# Patient Record
Sex: FEMALE | Race: WHITE
[De-identification: names, ages, dates, MRNs, and addresses within clinical notes are randomized per-mention and may not be internally consistent; named-entity substitution may affect disease eponyms.]

---

## 2018-01-26 ENCOUNTER — HOSPITAL ENCOUNTER (INPATIENT)
Age: 28
LOS: 2 days | Discharge: HOME | End: 2018-01-28

## 2018-01-26 ENCOUNTER — HOSPITAL ENCOUNTER (INPATIENT)
Dept: HOSPITAL 91 - OBT | Age: 28
LOS: 2 days | Discharge: HOME | End: 2018-01-28
Payer: MEDICAID

## 2018-01-26 DIAGNOSIS — Z3A.38: ICD-10-CM

## 2018-01-26 LAB
ADD MAN DIFF?: NO
BASOPHIL #: 0 10^3/UL (ref 0–0.1)
BASOPHILS %: 0.2 % (ref 0–2)
EOSINOPHILS #: 0 10^3/UL (ref 0–0.5)
EOSINOPHILS %: 0.3 % (ref 0–7)
HEMATOCRIT: 41.9 % (ref 37–47)
HEMOGLOBIN: 14.3 G/DL (ref 12–16)
HEPATITIS B SURFACE ANTIGEN: NEGATIVE
INR: 0.83
LYMPHOCYTES #: 2.5 10^3/UL (ref 0.8–2.9)
LYMPHOCYTES %: 23 % (ref 15–51)
MEAN CORPUSCULAR HEMOGLOBIN: 29.6 PG (ref 29–33)
MEAN CORPUSCULAR HGB CONC: 34.1 G/DL (ref 32–37)
MEAN CORPUSCULAR VOLUME: 86.7 FL (ref 82–101)
MEAN PLATELET VOLUME: 13 FL (ref 7.4–10.4)
MONOCYTE #: 0.5 10^3/UL (ref 0.3–0.9)
MONOCYTES %: 4.1 % (ref 0–11)
NEUTROPHIL #: 7.9 10^3/UL (ref 1.6–7.5)
NEUTROPHILS %: 72.1 % (ref 39–77)
NUCLEATED RED BLOOD CELLS #: 0 10^3/UL (ref 0–0)
NUCLEATED RED BLOOD CELLS%: 0 /100WBC (ref 0–0)
PARTIAL THROMBOPLASTIN TIME: 26.6 SEC (ref 25–35)
PLATELET COUNT: 145 10^3/UL (ref 140–415)
PROTIME: 11.5 SEC (ref 11.9–14.9)
PT RATIO: 0.9
RAPID PLASMA REAGIN: NONREACTIVE
RED BLOOD COUNT: 4.83 10^6/UL (ref 4.2–5.4)
RED CELL DISTRIBUTION WIDTH: 14.3 % (ref 11.5–14.5)
WHITE BLOOD COUNT: 11 10^3/UL (ref 4.8–10.8)

## 2018-01-26 PROCEDURE — 86592 SYPHILIS TEST NON-TREP QUAL: CPT

## 2018-01-26 PROCEDURE — 85730 THROMBOPLASTIN TIME PARTIAL: CPT

## 2018-01-26 PROCEDURE — 90715 TDAP VACCINE 7 YRS/> IM: CPT

## 2018-01-26 PROCEDURE — 87340 HEPATITIS B SURFACE AG IA: CPT

## 2018-01-26 PROCEDURE — 90716 VAR VACCINE LIVE SUBQ: CPT

## 2018-01-26 PROCEDURE — 85025 COMPLETE CBC W/AUTO DIFF WBC: CPT

## 2018-01-26 PROCEDURE — 85610 PROTHROMBIN TIME: CPT

## 2018-01-26 PROCEDURE — 86901 BLOOD TYPING SEROLOGIC RH(D): CPT

## 2018-01-26 PROCEDURE — 86900 BLOOD TYPING SEROLOGIC ABO: CPT

## 2018-01-26 RX ADMIN — IBUPROFEN 1 MG: 800 TABLET, FILM COATED ORAL at 23:32

## 2018-01-26 RX ADMIN — Medication 1 MLS/HR: at 11:50

## 2018-01-26 RX ADMIN — PYRIDOXINE HYDROCHLORIDE 1 MLS/HR: 100 INJECTION, SOLUTION INTRAMUSCULAR; INTRAVENOUS at 19:50

## 2018-01-26 RX ADMIN — Medication 1 APPLIC: at 12:14

## 2018-01-26 RX ADMIN — AMPICILLIN 1 MLS/HR: 1 INJECTION, POWDER, FOR SOLUTION INTRAMUSCULAR; INTRAVENOUS at 09:00

## 2018-01-26 RX ADMIN — AMPICILLIN 1 MLS/HR: 2 INJECTION, POWDER, FOR SOLUTION INTRAVENOUS at 05:41

## 2018-01-26 RX ADMIN — Medication 1 SPRAY: at 12:14

## 2018-01-26 RX ADMIN — IBUPROFEN 1 MG: 800 TABLET, FILM COATED ORAL at 17:35

## 2018-01-26 RX ADMIN — Medication 1 MLS/HR: at 09:32

## 2018-01-26 RX ADMIN — Medication 1 MLS/HR: at 10:38

## 2018-01-26 RX ADMIN — IBUPROFEN 1 MG: 800 TABLET, FILM COATED ORAL at 12:13

## 2018-01-26 RX ADMIN — BUTORPHANOL TARTRATE 1 MG: 2 INJECTION, SOLUTION INTRAMUSCULAR; INTRAVENOUS at 06:42

## 2018-01-26 RX ADMIN — PYRIDOXINE HYDROCHLORIDE 1 MLS/HR: 100 INJECTION, SOLUTION INTRAMUSCULAR; INTRAVENOUS at 05:37

## 2018-01-26 RX ADMIN — PYRIDOXINE HYDROCHLORIDE 1 MLS/HR: 100 INJECTION, SOLUTION INTRAMUSCULAR; INTRAVENOUS at 11:50

## 2018-01-26 RX ADMIN — WITCH HAZEL 1 PAD: 500 SOLUTION RECTAL; TOPICAL at 12:13

## 2018-01-27 LAB
ADD MAN DIFF?: NO
BASOPHIL #: 0 10^3/UL (ref 0–0.1)
BASOPHILS %: 0.4 % (ref 0–2)
EOSINOPHILS #: 0 10^3/UL (ref 0–0.5)
EOSINOPHILS %: 0.4 % (ref 0–7)
HEMATOCRIT: 38.9 % (ref 37–47)
HEMOGLOBIN: 12.9 G/DL (ref 12–16)
LYMPHOCYTES #: 3.2 10^3/UL (ref 0.8–2.9)
LYMPHOCYTES %: 29 % (ref 15–51)
MEAN CORPUSCULAR HEMOGLOBIN: 29.4 PG (ref 29–33)
MEAN CORPUSCULAR HGB CONC: 33.2 G/DL (ref 32–37)
MEAN CORPUSCULAR VOLUME: 88.6 FL (ref 82–101)
MEAN PLATELET VOLUME: 12.8 FL (ref 7.4–10.4)
MONOCYTE #: 0.5 10^3/UL (ref 0.3–0.9)
MONOCYTES %: 4.8 % (ref 0–11)
NEUTROPHIL #: 7.3 10^3/UL (ref 1.6–7.5)
NEUTROPHILS %: 65.1 % (ref 39–77)
NUCLEATED RED BLOOD CELLS #: 0 10^3/UL (ref 0–0)
NUCLEATED RED BLOOD CELLS%: 0 /100WBC (ref 0–0)
PLATELET COUNT: 138 10^3/UL (ref 140–415)
RED BLOOD COUNT: 4.39 10^6/UL (ref 4.2–5.4)
RED CELL DISTRIBUTION WIDTH: 14.3 % (ref 11.5–14.5)
WHITE BLOOD COUNT: 11.2 10^3/UL (ref 4.8–10.8)

## 2018-01-27 RX ADMIN — DOCUSATE SODIUM AND SENNOSIDES 1 TAB: 8.6; 5 TABLET, FILM COATED ORAL at 21:32

## 2018-01-27 RX ADMIN — IBUPROFEN 1 MG: 800 TABLET, FILM COATED ORAL at 23:35

## 2018-01-27 RX ADMIN — IBUPROFEN 1 MG: 800 TABLET, FILM COATED ORAL at 17:19

## 2018-01-27 RX ADMIN — IBUPROFEN 1 MG: 800 TABLET, FILM COATED ORAL at 05:33

## 2018-01-27 RX ADMIN — PYRIDOXINE HYDROCHLORIDE 1 MLS/HR: 100 INJECTION, SOLUTION INTRAMUSCULAR; INTRAVENOUS at 03:50

## 2018-01-27 RX ADMIN — IBUPROFEN 1 MG: 800 TABLET, FILM COATED ORAL at 11:46

## 2018-01-27 RX ADMIN — MAGNESIUM HYDROXIDE 1 ML: 400 SUSPENSION ORAL at 21:32

## 2018-01-28 RX ADMIN — VARICELLA VIRUS VACCINE LIVE 1 UNIT: 1350 INJECTION, POWDER, LYOPHILIZED, FOR SUSPENSION SUBCUTANEOUS at 09:48

## 2018-01-28 RX ADMIN — CLOSTRIDIUM TETANI TOXOID ANTIGEN (FORMALDEHYDE INACTIVATED), CORYNEBACTERIUM DIPHTHERIAE TOXOID ANTIGEN (FORMALDEHYDE INACTIVATED), BORDETELLA PERTUSSIS TOXOID ANTIGEN (GLUTARALDEHYDE INACTIVATED), BORDETELLA PERTUSSIS FILAMENTOUS HEMAGGLUTININ ANTIGEN (FORMALDEHYDE INACTIVATED), BORDETELLA PERTUSSIS PERTACTIN ANTIGEN, AND BORDETELLA PERTUSSIS FIMBRIAE 2/3 ANTIGEN 1 ML: 5; 2; 2.5; 5; 3; 5 INJECTION, SUSPENSION INTRAMUSCULAR at 09:47

## 2018-01-28 RX ADMIN — MEASLES, MUMPS, AND RUBELLA VIRUS VACCINE LIVE 1 ML: 1000; 12500; 1000 INJECTION, POWDER, LYOPHILIZED, FOR SUSPENSION SUBCUTANEOUS at 09:48

## 2018-01-28 RX ADMIN — IBUPROFEN 1 MG: 800 TABLET, FILM COATED ORAL at 05:33

## 2018-01-28 RX ADMIN — IBUPROFEN 1 MG: 800 TABLET, FILM COATED ORAL at 12:03

## 2019-04-18 ENCOUNTER — HOSPITAL ENCOUNTER (EMERGENCY)
Dept: HOSPITAL 10 - FTE | Age: 29
Discharge: HOME | End: 2019-04-18
Payer: MEDICAID

## 2019-04-18 ENCOUNTER — HOSPITAL ENCOUNTER (EMERGENCY)
Dept: HOSPITAL 91 - FTE | Age: 29
Discharge: HOME | End: 2019-04-18
Payer: MEDICAID

## 2019-04-18 VITALS — SYSTOLIC BLOOD PRESSURE: 107 MMHG | RESPIRATION RATE: 18 BRPM | DIASTOLIC BLOOD PRESSURE: 66 MMHG | HEART RATE: 105 BPM

## 2019-04-18 VITALS
BODY MASS INDEX: 28.18 KG/M2 | BODY MASS INDEX: 28.18 KG/M2 | WEIGHT: 149.25 LBS | HEIGHT: 61 IN | HEIGHT: 61 IN | WEIGHT: 149.25 LBS

## 2019-04-18 DIAGNOSIS — K52.9: Primary | ICD-10-CM

## 2019-04-18 DIAGNOSIS — N39.0: ICD-10-CM

## 2019-04-18 LAB
ADD MAN DIFF?: NO
ADD UMIC: YES
ALANINE AMINOTRANSFERASE: 18 IU/L (ref 13–69)
ALBUMIN/GLOBULIN RATIO: 1.23
ALBUMIN: 5.3 G/DL (ref 3.3–4.9)
ALKALINE PHOSPHATASE: 107 IU/L (ref 42–121)
ANION GAP: 20 (ref 5–13)
ASPARTATE AMINO TRANSFERASE: 35 IU/L (ref 15–46)
BASOPHIL #: 0 10^3/UL (ref 0–0.1)
BASOPHILS %: 0.2 % (ref 0–2)
BILIRUBIN,DIRECT: 0 MG/DL (ref 0–0.2)
BILIRUBIN,TOTAL: 0.5 MG/DL (ref 0.2–1.3)
BLOOD UREA NITROGEN: 22 MG/DL (ref 7–20)
CALCIUM: 9.5 MG/DL (ref 8.4–10.2)
CARBON DIOXIDE: 22 MMOL/L (ref 21–31)
CHLORIDE: 100 MMOL/L (ref 97–110)
CREATININE: 0.72 MG/DL (ref 0.44–1)
EOSINOPHILS #: 0 10^3/UL (ref 0–0.5)
EOSINOPHILS %: 0.1 % (ref 0–7)
GLOBULIN: 4.3 G/DL (ref 1.3–3.2)
GLUCOSE: 106 MG/DL (ref 70–220)
HEMATOCRIT: 46.2 % (ref 37–47)
HEMOGLOBIN: 15.6 G/DL (ref 12–16)
IMMATURE GRANS #M: 0.03 10^3/UL (ref 0–0.03)
IMMATURE GRANS % (M): 0.3 % (ref 0–0.43)
LIPASE: 55 U/L (ref 23–300)
LYMPHOCYTES #: 0.8 10^3/UL (ref 0.8–2.9)
LYMPHOCYTES %: 8.6 % (ref 15–51)
MEAN CORPUSCULAR HEMOGLOBIN: 28.2 PG (ref 29–33)
MEAN CORPUSCULAR HGB CONC: 33.8 G/DL (ref 32–37)
MEAN CORPUSCULAR VOLUME: 83.4 FL (ref 82–101)
MEAN PLATELET VOLUME: 11 FL (ref 7.4–10.4)
MONOCYTE #: 0.5 10^3/UL (ref 0.3–0.9)
MONOCYTES %: 5.3 % (ref 0–11)
NEUTROPHIL #: 7.9 10^3/UL (ref 1.6–7.5)
NEUTROPHILS %: 85.5 % (ref 39–77)
NUCLEATED RED BLOOD CELLS #: 0 10^3/UL (ref 0–0)
NUCLEATED RED BLOOD CELLS%: 0 /100WBC (ref 0–0)
PLATELET COUNT: 252 10^3/UL (ref 140–415)
POTASSIUM: 3.4 MMOL/L (ref 3.5–5.1)
RED BLOOD COUNT: 5.54 10^6/UL (ref 4.2–5.4)
RED CELL DISTRIBUTION WIDTH: 13.8 % (ref 11.5–14.5)
SODIUM: 142 MMOL/L (ref 135–144)
TOTAL PROTEIN: 9.6 G/DL (ref 6.1–8.1)
UR AMORPHOUS CRYSTAL: (no result) /HPF
UR ASCORBIC ACID: 40 MG/DL
UR BACTERIA: (no result) /HPF
UR BILIRUBIN (DIP): (no result) MG/DL
UR BLOOD (DIP): NEGATIVE MG/DL
UR CLARITY: (no result)
UR COLOR: YELLOW
UR GLUCOSE (DIP): (no result) MG/DL
UR HYALINE CAST: (no result) /HPF
UR KETONES (DIP): NEGATIVE MG/DL
UR LEUKOCYTE ESTERASE (DIP): NEGATIVE LEU/UL
UR MUCUS: (no result) /HPF
UR NITRITE (DIP): NEGATIVE MG/DL
UR RBC: 0 /HPF (ref 0–5)
UR SPECIFIC GRAVITY (DIP): 1.03 (ref 1–1.03)
UR SQUAMOUS EPITHELIAL CELL: (no result) /HPF
UR TOTAL PROTEIN (DIP): (no result) MG/DL
UR UROBILINOGEN (DIP): (no result) MG/DL
UR WBC: 14 /HPF (ref 0–5)
URINE PH (DIP): 5 (ref 5–9)
WHITE BLOOD COUNT: 9.3 10^3/UL (ref 4.8–10.8)

## 2019-04-18 PROCEDURE — 83605 ASSAY OF LACTIC ACID: CPT

## 2019-04-18 PROCEDURE — 96375 TX/PRO/DX INJ NEW DRUG ADDON: CPT

## 2019-04-18 PROCEDURE — C9113 INJ PANTOPRAZOLE SODIUM, VIA: HCPCS

## 2019-04-18 PROCEDURE — 80053 COMPREHEN METABOLIC PANEL: CPT

## 2019-04-18 PROCEDURE — 81025 URINE PREGNANCY TEST: CPT

## 2019-04-18 PROCEDURE — 85025 COMPLETE CBC W/AUTO DIFF WBC: CPT

## 2019-04-18 PROCEDURE — 84703 CHORIONIC GONADOTROPIN ASSAY: CPT

## 2019-04-18 PROCEDURE — 96374 THER/PROPH/DIAG INJ IV PUSH: CPT

## 2019-04-18 PROCEDURE — 83690 ASSAY OF LIPASE: CPT

## 2019-04-18 PROCEDURE — 81001 URINALYSIS AUTO W/SCOPE: CPT

## 2019-04-18 PROCEDURE — 99284 EMERGENCY DEPT VISIT MOD MDM: CPT

## 2019-04-18 RX ADMIN — PYRIDOXINE HYDROCHLORIDE 1 MLS/HR: 100 INJECTION, SOLUTION INTRAMUSCULAR; INTRAVENOUS at 15:20

## 2019-04-18 RX ADMIN — CIPROFLOXACIN HYDROCHLORIDE 1 MG: 500 TABLET, FILM COATED ORAL at 15:41

## 2019-04-18 RX ADMIN — PANTOPRAZOLE SODIUM 1 MG: 40 INJECTION, POWDER, FOR SOLUTION INTRAVENOUS at 15:21

## 2019-04-18 RX ADMIN — ONDANSETRON HYDROCHLORIDE 1 MG: 2 INJECTION, SOLUTION INTRAMUSCULAR; INTRAVENOUS at 15:21
